# Patient Record
Sex: MALE | Race: WHITE | Employment: FULL TIME | URBAN - METROPOLITAN AREA
[De-identification: names, ages, dates, MRNs, and addresses within clinical notes are randomized per-mention and may not be internally consistent; named-entity substitution may affect disease eponyms.]

---

## 2018-02-25 ENCOUNTER — HOSPITAL ENCOUNTER (EMERGENCY)
Age: 63
Discharge: HOME | End: 2018-02-25
Attending: FAMILY MEDICINE

## 2018-02-25 VITALS
OXYGEN SATURATION: 98 % | BODY MASS INDEX: 30.2 KG/M2 | RESPIRATION RATE: 18 BRPM | TEMPERATURE: 97 F | HEIGHT: 72 IN | DIASTOLIC BLOOD PRESSURE: 85 MMHG | WEIGHT: 223 LBS | HEART RATE: 81 BPM | SYSTOLIC BLOOD PRESSURE: 179 MMHG

## 2018-02-25 DIAGNOSIS — K09.8 MUCOUS CYST OF MOUTH: Primary | ICD-10-CM

## 2018-02-25 NOTE — UC PROVIDER NOTE
Patient is a 57 y.o. male presenting with oral lesions. The history is provided by the patient. Mouth Lesions    The current episode started 2 days ago. The problem occurs continuously. The problem has been unchanged. The problem is mild. Nothing relieves the symptoms. Pertinent negatives include no sore throat and no swollen glands. Associated symptoms comments: No mouth pain  Small cyst on rt inner cheek- near mouth. History reviewed. No pertinent past medical history. Past Surgical History:   Procedure Laterality Date    HX ORTHOPAEDIC      carpel tunnel     HX TONSILLECTOMY           History reviewed. No pertinent family history. Social History     Social History    Marital status:      Spouse name: N/A    Number of children: N/A    Years of education: N/A     Occupational History    Not on file. Social History Main Topics    Smoking status: Never Smoker    Smokeless tobacco: Never Used    Alcohol use Not on file    Drug use: Not on file    Sexual activity: Not on file     Other Topics Concern    Not on file     Social History Narrative    No narrative on file                ALLERGIES: Review of patient's allergies indicates no known allergies. Review of Systems   HENT: Negative for sore throat. All other systems reviewed and are negative. Vitals:    02/25/18 1725   BP: 179/85   Pulse: 81   Resp: 18   Temp: 97 °F (36.1 °C)   SpO2: 98%   Weight: 101.2 kg (223 lb)   Height: 6' (1.829 m)       Physical Exam   Constitutional: No distress. HENT:   Mouth/Throat: Oral lesions (small cystic lesion on rt inner cheek- non tender) present. No uvula swelling. Nursing note and vitals reviewed.       MDM     Differential Diagnosis; Clinical Impression; Plan:     CLINICAL IMPRESSION:  Mucous cyst of mouth  (primary encounter diagnosis)      DDX    Plan:    reassured - no treatment necessary   Follow with oral surgery for removal   Amount and/or Complexity of Data Reviewed: Review and summarize past medical records:  Yes  Risk of Significant Complications, Morbidity, and/or Mortality:   Presenting problems:  Minimal  Management options:  Minimal  Progress:   Patient progress:  Stable      Procedures

## 2021-11-10 ENCOUNTER — OFFICE VISIT (OUTPATIENT)
Dept: URGENT CARE | Facility: CLINIC | Age: 66
End: 2021-11-10
Payer: MEDICARE

## 2021-11-10 VITALS
RESPIRATION RATE: 16 BRPM | WEIGHT: 220 LBS | OXYGEN SATURATION: 99 % | SYSTOLIC BLOOD PRESSURE: 150 MMHG | BODY MASS INDEX: 29.8 KG/M2 | HEIGHT: 72 IN | TEMPERATURE: 98.9 F | HEART RATE: 90 BPM | DIASTOLIC BLOOD PRESSURE: 100 MMHG

## 2021-11-10 DIAGNOSIS — J06.9 UPPER RESPIRATORY TRACT INFECTION, UNSPECIFIED TYPE: ICD-10-CM

## 2021-11-10 DIAGNOSIS — J02.9 PHARYNGITIS, UNSPECIFIED ETIOLOGY: ICD-10-CM

## 2021-11-10 LAB
INT CON NEG: NORMAL
INT CON POS: NORMAL
S PYO AG THROAT QL: NORMAL

## 2021-11-10 PROCEDURE — 87880 STREP A ASSAY W/OPTIC: CPT | Performed by: PHYSICIAN ASSISTANT

## 2021-11-10 PROCEDURE — 99203 OFFICE O/P NEW LOW 30 MIN: CPT | Performed by: PHYSICIAN ASSISTANT

## 2021-11-10 ASSESSMENT — ENCOUNTER SYMPTOMS
SORE THROAT: 1
SPUTUM PRODUCTION: 1
COUGH: 1
WHEEZING: 0
MYALGIAS: 0
NAUSEA: 0
HEADACHES: 1
CHILLS: 0
VOMITING: 0
SHORTNESS OF BREATH: 0
ABDOMINAL PAIN: 0
FEVER: 0

## 2021-11-10 NOTE — PROGRESS NOTES
Subjective:   Malcolm Allen is a 66 y.o. male who presents for Pharyngitis (x 2 days , )      HPI  66 y.o. male presents to urgent care with new problem to provider of sore throat, headache, fatigue, and mild cough onset 2 days ago.  He denies fevers.  Patient is not vaccinated for COVID-19, however denies specific exposure or sick contacts.  He is not taking any over-the-counter medications for his symptoms.  He denies chest pain or shortness of breath. Denies other associated aggravating or alleviating factors.     Review of Systems   Constitutional: Positive for malaise/fatigue. Negative for chills and fever.   HENT: Positive for congestion and sore throat.    Respiratory: Positive for cough and sputum production. Negative for shortness of breath and wheezing.    Gastrointestinal: Negative for abdominal pain, nausea and vomiting.   Musculoskeletal: Negative for myalgias.   Neurological: Positive for headaches.   Endo/Heme/Allergies: Positive for environmental allergies.       There is no problem list on file for this patient.    History reviewed. No pertinent surgical history.  Social History     Tobacco Use   • Smoking status: Not on file   • Smokeless tobacco: Not on file   Substance Use Topics   • Alcohol use: Not on file   • Drug use: Not on file      History reviewed. No pertinent family history.   (Allergies, Medications, & Tobacco/Substance Use were reconciled by the Medical Assistant and reviewed by myself. The family history is prepopulated)     Objective:     /100   Pulse 90   Temp 37.2 °C (98.9 °F) (Temporal)   Resp 16   Ht 1.829 m (6')   Wt 99.8 kg (220 lb)   SpO2 99%   BMI 29.84 kg/m²     Physical Exam  Vitals reviewed.   Constitutional:       General: He is not in acute distress.     Appearance: Normal appearance. He is well-developed. He is not ill-appearing.   HENT:      Head: Normocephalic and atraumatic.      Nose: Nose normal.      Mouth/Throat:      Mouth: Mucous membranes are  moist.      Pharynx: Posterior oropharyngeal erythema present.   Eyes:      Conjunctiva/sclera: Conjunctivae normal.   Cardiovascular:      Rate and Rhythm: Normal rate and regular rhythm.      Heart sounds: Normal heart sounds.   Pulmonary:      Effort: Pulmonary effort is normal. No respiratory distress.      Breath sounds: Normal breath sounds.   Abdominal:      Palpations: Abdomen is soft.   Musculoskeletal:      Cervical back: Normal range of motion and neck supple.   Lymphadenopathy:      Cervical: No cervical adenopathy.   Skin:     General: Skin is warm and dry.   Neurological:      General: No focal deficit present.      Mental Status: He is alert and oriented to person, place, and time.   Psychiatric:         Mood and Affect: Mood normal.         Behavior: Behavior normal.         Thought Content: Thought content normal.         Judgment: Judgment normal.         Assessment/Plan:     1. Pharyngitis, unspecified etiology  POCT Rapid Strep A   2. Upper respiratory tract infection, unspecified type       Point of care testing for strep is negative in clinic today.  Recommend COVID-19 testing at this time, patient declines.  I informed patient that I would not be able to give him a note stating he may return to work without COVID-19 testing which he understands.  I recommend self isolation per CDC guidelines.  Over-the-counter cold and cough medications and Tylenol/Motrin for symptomatic relief.  ED precautions for worsening symptoms of cough or development of shortness of breath.  Monitor for fevers.    Differential diagnosis, natural history, supportive care, and indications for immediate follow-up discussed.    Advised the patient to follow-up with the primary care physician for recheck, reevaluation, and consideration of further management.  Patient verbalized understanding of treatment plan and has no further questions regarding care.     I personally reviewed prior external notes and test results  pertinent to today's visit.     Please note that this dictation was created using voice recognition software. I have made a reasonable attempt to correct obvious errors, but I expect that there are errors of grammar and possibly content that I did not discover before finalizing the note.    This note was electronically signed by Chandni Zayas PA-C